# Patient Record
Sex: FEMALE | Race: WHITE | URBAN - METROPOLITAN AREA
[De-identification: names, ages, dates, MRNs, and addresses within clinical notes are randomized per-mention and may not be internally consistent; named-entity substitution may affect disease eponyms.]

---

## 2017-03-13 ENCOUNTER — TELEPHONE (OUTPATIENT)
Dept: INTERNAL MEDICINE | Facility: CLINIC | Age: 42
End: 2017-03-13

## 2017-03-13 NOTE — LETTER
[facility]  [address] [facility] [address] [phone]          March 13, 2017    Marina Shaikh  [address] [place] [address]            Our records indicate that you are due for a Pap smear   Monitoring and managing your preventative and chronic health conditions are very important to us.     If you have received your health care elsewhere, please provide us with that information so it can be documented in your chart.    Please call [phone] or message us through your [employer] account to schedule an appointment or provide information for your chart.     I look forward to seeing you and working with you on your health care needs.         Sincerely,       MINDY Monique   on behalf of   Zaina Banuelos MD

## 2017-04-13 ENCOUNTER — TELEPHONE (OUTPATIENT)
Dept: FAMILY MEDICINE | Facility: CLINIC | Age: 42
End: 2017-04-13

## 2017-04-13 DIAGNOSIS — E03.9 HYPOTHYROIDISM, UNSPECIFIED TYPE: ICD-10-CM

## 2017-04-13 DIAGNOSIS — E78.5 HYPERLIPIDEMIA WITH TARGET LDL LESS THAN 160: Primary | ICD-10-CM

## 2017-04-13 DIAGNOSIS — Z13.1 SCREENING FOR DIABETES MELLITUS: ICD-10-CM

## 2017-04-14 NOTE — TELEPHONE ENCOUNTER
Patient has upcoming physical on 04/27 she also has prior lab appt. On 04/22 in order to receive a refill on medication. Patient is just wanting to make sure that her orders are in for the Lab.

## 2017-04-14 NOTE — TELEPHONE ENCOUNTER
Need previsit labs-See pending orders and close encounter./Cassandra Eagle,         Physical 4/27/17

## 2017-04-17 PROBLEM — E03.9 HYPOTHYROIDISM, UNSPECIFIED TYPE: Status: ACTIVE | Noted: 2017-04-17

## 2017-04-22 DIAGNOSIS — E03.9 HYPOTHYROIDISM, UNSPECIFIED TYPE: ICD-10-CM

## 2017-04-22 DIAGNOSIS — Z13.1 SCREENING FOR DIABETES MELLITUS: ICD-10-CM

## 2017-04-22 DIAGNOSIS — E78.5 HYPERLIPIDEMIA WITH TARGET LDL LESS THAN 160: ICD-10-CM

## 2017-04-22 PROCEDURE — 82947 ASSAY GLUCOSE BLOOD QUANT: CPT | Performed by: PHYSICIAN ASSISTANT

## 2017-04-22 PROCEDURE — 36415 COLL VENOUS BLD VENIPUNCTURE: CPT | Performed by: PHYSICIAN ASSISTANT

## 2017-04-22 PROCEDURE — 80061 LIPID PANEL: CPT | Performed by: PHYSICIAN ASSISTANT

## 2017-04-22 PROCEDURE — 84443 ASSAY THYROID STIM HORMONE: CPT | Performed by: PHYSICIAN ASSISTANT

## 2017-04-22 NOTE — LETTER
[facility]  [address] [facility] [id number] [phone]        April 24, 2017    Marina Shaikh  [address] [place] [address]            Dear aMrina,    The results of your recent tests were normal.     Below is a copy of the results. If you have any questions or concerns, please call myself or my nurse at [phone].    Sincerely,    Kristen Kehr PA-C/na    Results for orders placed or performed in visit on 04/22/17   Lipid Profile with reflex to direct LDL   Result Value Ref Range    Cholesterol 132 <200 mg/dL    Triglycerides 140 <150 mg/dL    HDL Cholesterol 37 (L) >49 mg/dL    LDL Cholesterol Calculated 67 <100 mg/dL    Non HDL Cholesterol 95 <130 mg/dL   TSH with free T4 reflex   Result Value Ref Range    TSH 1.47 0.40 - 4.00 mU/L   Glucose   Result Value Ref Range    Glucose 92 70 - 99 mg/dL

## 2017-04-24 LAB
CHOLEST SERPL-MCNC: 132 MG/DL
GLUCOSE SERPL-MCNC: 92 MG/DL (ref 70–99)
HDLC SERPL-MCNC: 37 MG/DL
LDLC SERPL CALC-MCNC: 67 MG/DL
NONHDLC SERPL-MCNC: 95 MG/DL
TRIGL SERPL-MCNC: 140 MG/DL
TSH SERPL DL<=0.005 MIU/L-ACNC: 1.47 MU/L (ref 0.4–4)

## 2017-04-27 ENCOUNTER — OFFICE VISIT (OUTPATIENT)
Dept: FAMILY MEDICINE | Facility: CLINIC | Age: 42
End: 2017-04-27
Payer: COMMERCIAL

## 2017-04-27 VITALS
BODY MASS INDEX: 31.39 KG/M2 | TEMPERATURE: 97.5 F | HEIGHT: 67 IN | WEIGHT: 200 LBS | SYSTOLIC BLOOD PRESSURE: 112 MMHG | DIASTOLIC BLOOD PRESSURE: 68 MMHG | HEART RATE: 74 BPM

## 2017-04-27 DIAGNOSIS — Z12.4 SCREENING FOR MALIGNANT NEOPLASM OF CERVIX: ICD-10-CM

## 2017-04-27 DIAGNOSIS — Z00.00 ROUTINE GENERAL MEDICAL EXAMINATION AT A HEALTH CARE FACILITY: Primary | ICD-10-CM

## 2017-04-27 DIAGNOSIS — E78.5 HYPERLIPIDEMIA LDL GOAL <160: ICD-10-CM

## 2017-04-27 DIAGNOSIS — Z12.31 VISIT FOR SCREENING MAMMOGRAM: ICD-10-CM

## 2017-04-27 DIAGNOSIS — E66.09 OBESITY DUE TO EXCESS CALORIES, UNSPECIFIED OBESITY SEVERITY: ICD-10-CM

## 2017-04-27 DIAGNOSIS — E03.9 HYPOTHYROIDISM, UNSPECIFIED TYPE: ICD-10-CM

## 2017-04-27 PROCEDURE — 99396 PREV VISIT EST AGE 40-64: CPT | Performed by: PHYSICIAN ASSISTANT

## 2017-04-27 PROCEDURE — 87624 HPV HI-RISK TYP POOLED RSLT: CPT | Performed by: PHYSICIAN ASSISTANT

## 2017-04-27 PROCEDURE — G0145 SCR C/V CYTO,THINLAYER,RESCR: HCPCS | Performed by: PHYSICIAN ASSISTANT

## 2017-04-27 RX ORDER — LEVOTHYROXINE SODIUM 125 UG/1
TABLET ORAL
Qty: 90 TABLET | Refills: 2 | Status: SHIPPED | OUTPATIENT
Start: 2017-04-27 | End: 2018-05-25

## 2017-04-27 RX ORDER — LEVOTHYROXINE SODIUM 137 UG/1
TABLET ORAL
Qty: 90 TABLET | Refills: 3 | Status: SHIPPED | OUTPATIENT
Start: 2017-04-27 | End: 2018-05-25

## 2017-04-27 RX ORDER — SIMVASTATIN 20 MG
20 TABLET ORAL AT BEDTIME
Qty: 90 TABLET | Refills: 3 | Status: SHIPPED | OUTPATIENT
Start: 2017-04-27 | End: 2018-05-25

## 2017-04-27 NOTE — MR AVS SNAPSHOT
After Visit Summary   4/27/2017    Marina Shaikh    MRN: [id number]           Patient Information     Date Of Birth          1975        Visit Information        Provider Department      4/27/2017 8:30 AM Kehr, Kristen M, PA-C [facility]        Today's Diagnoses     Routine general medical examination at a health care facility    -  1    Hypothyroidism, unspecified type        Hyperlipidemia LDL goal <160        Screening for malignant neoplasm of cervix        Visit for screening mammogram          Care Instructions      Preventive Health Recommendations  Female Ages 40 to 49    Yearly exam:     See your health care provider every year in order to  1. Review health changes.   2. Discuss preventive care.    3. Review your medicines if your doctor prescribed any.      Get a Pap test every three years (unless you have an abnormal result and your provider advises testing more often).      If you get Pap tests with HPV test, you only need to test every 5 years, unless you have an abnormal result. You do not need a Pap test if your uterus was removed (hysterectomy) and you have not had cancer.      You should be tested each year for STDs (sexually transmitted diseases), if you're at risk.       Ask your doctor if you should have a mammogram.      Have a colonoscopy (test for colon cancer) if someone in your family has had colon cancer or polyps before age 50.       Have a cholesterol test every 5 years.       Have a diabetes test (fasting glucose) after age 45. If you are at risk for diabetes, you should have this test every 3 years.    Shots: Get a flu shot each year. Get a tetanus shot every 10 years.     Nutrition:     Eat at least 5 servings of fruits and vegetables each day.    Eat whole-grain bread, whole-wheat pasta and brown rice instead of white grains and rice.    Talk to your provider about Calcium and Vitamin D.     Lifestyle    Exercise at least 150 minutes a week (an average  "of 30 minutes a day, 5 days a week). This will help you control your weight and prevent disease.    Limit alcohol to one drink per day.    No smoking.     Wear sunscreen to prevent skin cancer.    See your dentist every six months for an exam and cleaning.      Schedule mammogram      Results for orders placed or performed in visit on 04/22/17   Lipid Profile with reflex to direct LDL   Result Value Ref Range    Cholesterol 132 <200 mg/dL    Triglycerides 140 <150 mg/dL    HDL Cholesterol 37 (L) >49 mg/dL    LDL Cholesterol Calculated 67 <100 mg/dL    Non HDL Cholesterol 95 <130 mg/dL   TSH with free T4 reflex   Result Value Ref Range    TSH 1.47 0.40 - 4.00 mU/L   Glucose   Result Value Ref Range    Glucose 92 70 - 99 mg/dL             Follow-ups after your visit        Future tests that were ordered for you today     Open Future Orders        Priority Expected Expires Ordered    *MA Screening Digital Bilateral Routine  4/27/2018 4/27/2017            Who to contact     If you have questions or need follow up information about today's clinic visit or your schedule please contact [facility] directly at [phone].  Normal or non-critical lab and imaging results will be communicated to you by [employer], letter or phone within 4 business days after the clinic has received the results. If you do not hear from us within 7 days, please contact the clinic through [employer] or phone. If you have a critical or abnormal lab result, we will notify you by phone as soon as possible.  Submit refill requests through [employer] or call your pharmacy and they will forward the refill request to us. Please allow 3 business days for your refill to be completed.          Additional Information About Your Visit        [employer] Information     [employer] lets you send messages to your doctor, view your test results, renew your prescriptions, schedule appointments and more. To sign up, go to [email or web address] . Click on \"Log " "in\" on the left side of the screen, which will take you to the Welcome page. Then click on \"Sign up Now\" on the right side of the page.     You will be asked to enter the access code listed below, as well as some personal information. Please follow the directions to create your username and password.     Your access code is: [email or web address]  Expires: 2017  9:12 AM     Your access code will  in 90 days. If you need help or a new code, please call your [place] clinic or [phone].        Care EveryWhere ID     This is your Care EveryWhere ID. This could be used by other organizations to access your [place] medical records  [id number]        Your Vitals Were     Pulse Temperature Height BMI (Body Mass Index)          74 97.5  F (36.4  C) (Oral) 5' 7\" (1.702 m) 31.32 kg/m2         Blood Pressure from Last 3 Encounters:   17 112/68   16 120/78   07/15/14 118/82    Weight from Last 3 Encounters:   17 200 lb (90.7 kg)   16 255 lb (115.7 kg)   07/15/14 237 lb (107.5 kg)              We Performed the Following     HPV High Risk Types DNA Cervical     Pap imaged thin layer screen with HPV - recommended age 30 - 65          Where to get your medicines      These medications were sent to [facility] [id number] [place] [address], [address]  [address] [facility] [address], [facility] [address]     Phone:  [phone]     levothyroxine 125 MCG tablet    levothyroxine 137 MCG tablet    simvastatin 20 MG tablet          Primary Care Provider Office Phone # Fax #    Kristen M Kehr, PA-C [phone]       [facility] [address] [facility] [address]        Thank you!     Thank you for choosing [facility]  for your care. Our goal is always to provide you with excellent care. Hearing back from our patients is one way we can continue to improve our services. Please take a few minutes to complete the written survey that you may receive in the " mail after your visit with us. Thank you!             Your Updated Medication List - Protect others around you: Learn how to safely use, store and throw away your medicines at www.disposemymeds.org.          This list is accurate as of: 4/27/17  9:16 AM.  Always use your most recent med list.                   Brand Name Dispense Instructions for use    CALCIUM + D PO          * levothyroxine 137 MCG tablet    SYNTHROID/LEVOTHROID    90 tablet    137 mcg Monday through Friday       * levothyroxine 125 MCG tablet    SYNTHROID/LEVOTHROID    90 tablet    125 mcg Sat and Sun, 137 mcg daily M-F       MULTI-VITAMIN PO      1 tablet once a day       simvastatin 20 MG tablet    ZOCOR    90 tablet    Take 1 tablet (20 mg) by mouth At Bedtime       * Notice:  This list has 2 medication(s) that are the same as other medications prescribed for you. Read the directions carefully, and ask your doctor or other care provider to review them with you.

## 2017-04-27 NOTE — PATIENT INSTRUCTIONS
Preventive Health Recommendations  Female Ages 40 to 49    Yearly exam:     See your health care provider every year in order to  1. Review health changes.   2. Discuss preventive care.    3. Review your medicines if your doctor prescribed any.      Get a Pap test every three years (unless you have an abnormal result and your provider advises testing more often).      If you get Pap tests with HPV test, you only need to test every 5 years, unless you have an abnormal result. You do not need a Pap test if your uterus was removed (hysterectomy) and you have not had cancer.      You should be tested each year for STDs (sexually transmitted diseases), if you're at risk.       Ask your doctor if you should have a mammogram.      Have a colonoscopy (test for colon cancer) if someone in your family has had colon cancer or polyps before age 50.       Have a cholesterol test every 5 years.       Have a diabetes test (fasting glucose) after age 45. If you are at risk for diabetes, you should have this test every 3 years.    Shots: Get a flu shot each year. Get a tetanus shot every 10 years.     Nutrition:     Eat at least 5 servings of fruits and vegetables each day.    Eat whole-grain bread, whole-wheat pasta and brown rice instead of white grains and rice.    Talk to your provider about Calcium and Vitamin D.     Lifestyle    Exercise at least 150 minutes a week (an average of 30 minutes a day, 5 days a week). This will help you control your weight and prevent disease.    Limit alcohol to one drink per day.    No smoking.     Wear sunscreen to prevent skin cancer.    See your dentist every six months for an exam and cleaning.      Schedule mammogram      Results for orders placed or performed in visit on 04/22/17   Lipid Profile with reflex to direct LDL   Result Value Ref Range    Cholesterol 132 <200 mg/dL    Triglycerides 140 <150 mg/dL    HDL Cholesterol 37 (L) >49 mg/dL    LDL Cholesterol Calculated 67 <100 mg/dL     Non HDL Cholesterol 95 <130 mg/dL   TSH with free T4 reflex   Result Value Ref Range    TSH 1.47 0.40 - 4.00 mU/L   Glucose   Result Value Ref Range    Glucose 92 70 - 99 mg/dL

## 2017-04-27 NOTE — LETTER
May 4, 2017      Marina Shaikh  [address] [place] [address]    Dear ,      I am happy to inform you that your recent cervical cancer screening test (PAP smear) was normal.      Preventative screenings such as this help to ensure your health for years to come. You should repeat a pap smear in 3 years, unless otherwise directed.      You will still need to return to the clinic every year for your annual exam and other preventive tests.     Please contact the clinic at [phone] if you have further questions.       Sincerely,      Kristen M. Kehr, PA-C/keli

## 2017-04-27 NOTE — PROGRESS NOTES
SUBJECTIVE:     CC: Marina Shaikh is an 41 year old woman who presents for preventive health visit.     Healthy Habits:    Do you get at least three servings of calcium containing foods daily (dairy, green leafy vegetables, etc.)? no, taking calcium and/or vitamin D supplement: yes - calcium     Amount of exercise or daily activities, outside of work: active    Problems taking medications regularly No    Medication side effects: No    Have you had an eye exam in the past two years? yes    Do you see a dentist twice per year? yes    Do you have sleep apnea, excessive snoring or daytime drowsiness?            Today's PHQ-2 Score:   PHQ-2 ( 1999 Pf[employer]) 4/27/2017 5/23/2016   Q1: Little interest or pleasure in doing things 0 0   Q2: Feeling down, depressed or hopeless 0 0   PHQ-2 Score 0 0       Abuse: Current or Past(Physical, Sexual or Emotional)- No  Do you feel safe in your environment - Yes    Social History   Substance Use Topics     Smoking status: Former Smoker     Quit date: 7/22/1999     Smokeless tobacco: Never Used     Alcohol use Yes      Comment: rare     The patient does not drink >3 drinks per day nor >7 drinks per week.    Recent Labs   Lab Test  04/22/17   0935  07/14/16   0707   07/07/14   0730  06/25/13   0718   CHOL  132  176   < >  239*  186   HDL  37*  38*   < >  35*  37*   LDL  67  99   < >  157*  113   TRIG  140  193*   < >  234*  182*   CHOLHDLRATIO   --    --    --   6.9*  5.0   NHDL  95  138*   < >   --    --     < > = values in this interval not displayed.       Reviewed orders with patient.  Reviewed health maintenance and updated orders accordingly - Yes    Mammo Decision Support:  Mammogram not appropriate for this patient based on age.  Patient under age 50, mutual decision reflected in health maintenance.      Pertinent mammograms are reviewed under the imaging tab.  History of abnormal Pap smear:   NO - age 30- 65 PAP every 3 years recommended  Last 3 Pap Results:   PAP (no units)    Date Value   07/02/2013 NIL   08/02/2010 NIL   07/22/2009 NIL       Reviewed and updated as needed this visit by clinical staff  Tobacco  Allergies  Meds  Med Hx  Surg Hx  Fam Hx  Soc Hx        Reviewed and updated as needed this visit by Provider        Past Medical History:   Diagnosis Date     Anxiety      Hyperlipidemia LDL goal < 160      Hypothyroid      Obesity       Past Surgical History:   Procedure Laterality Date     C LIGATE FALLOPIAN TUBE  06/27/2008       ROS:  C: NEGATIVE for fever, chills,She has been on the [employer] program for weight loss. She has lost a total of 55 pounds.  I: NEGATIVE for worrisome rashes, moles or lesions  E: NEGATIVE for vision changes or irritation  ENT: NEGATIVE for ear, mouth and throat problems  R: NEGATIVE for significant cough or SOB  B: NEGATIVE for masses, tenderness or discharge  CV: NEGATIVE for chest pain, palpitations or peripheral edema  GI: NEGATIVE for nausea, abdominal pain, heartburn, or change in bowel habits  : NEGATIVE for unusual urinary or vaginal symptoms. Periods are regular.  M: NEGATIVE for significant arthralgias or myalgia  N: NEGATIVE for weakness, dizziness or paresthesias  E: NEGATIVE for temperature intolerance, skin/hair changes  P: NEGATIVE for changes in mood or affect    Problem list, Medication list, Allergies, and Medical/Social/Surgical histories reviewed in EPIC and updated as appropriate.  BP Readings from Last 3 Encounters:   04/27/17 112/68   05/23/16 120/78   07/15/14 118/82    Wt Readings from Last 3 Encounters:   04/27/17 200 lb (90.7 kg)   05/23/16 255 lb (115.7 kg)   07/15/14 237 lb (107.5 kg)                  Patient Active Problem List   Diagnosis     Hyperlipidemia with target LDL less than 160     Obesity     Anxiety     Hypothyroidism     Hypothyroidism, unspecified type     Past Surgical History:   Procedure Laterality Date     C LIGATE FALLOPIAN TUBE  06/27/2008       Social History   Substance Use Topics      "Smoking status: Former Smoker     Quit date: 7/22/1999     Smokeless tobacco: Never Used     Alcohol use Yes      Comment: rare     Family History   Problem Relation Age of Onset     Lipids Mother      Hypertension Maternal Grandmother      CANCER Maternal Grandfather      HEART DISEASE Maternal Grandfather      Lipids Brother      HEART DISEASE Father          OBJECTIVE:     /68 (Cuff Size: Adult Large)  Pulse 74  Temp 97.5  F (36.4  C) (Oral)  Ht 5' 7\" (1.702 m)  Wt 200 lb (90.7 kg)  BMI 31.32 kg/m2  EXAM:  GENERAL: healthy, alert and no distress  EYES: Eyes grossly normal to inspection, PERRL and conjunctivae and sclerae normal  HENT: ear canals and TM's normal, nose and mouth without ulcers or lesions  NECK: no adenopathy, no asymmetry, masses, or scars and thyroid normal to palpation  RESP: lungs clear to auscultation - no rales, rhonchi or wheezes  BREAST: normal without masses, tenderness or nipple discharge and no palpable axillary masses or adenopathy  CV: regular rate and rhythm, normal S1 S2, no S3 or S4, no murmur, click or rub, no peripheral edema and peripheral pulses strong  ABDOMEN: soft, nontender, no hepatosplenomegaly, no masses and bowel sounds normal   (female): normal female external genitalia, normal urethral meatus, vaginal mucosa pink, moist, well rugated, and normal cervix/adnexa/uterus without masses or discharge  MS: no gross musculoskeletal defects noted, no edema  SKIN: no suspicious lesions or rashes  NEURO: Normal strength and tone, mentation intact and speech normal  PSYCH: mentation appears normal, affect normal/bright    Results for orders placed or performed in visit on 04/22/17   Lipid Profile with reflex to direct LDL   Result Value Ref Range    Cholesterol 132 <200 mg/dL    Triglycerides 140 <150 mg/dL    HDL Cholesterol 37 (L) >49 mg/dL    LDL Cholesterol Calculated 67 <100 mg/dL    Non HDL Cholesterol 95 <130 mg/dL   TSH with free T4 reflex   Result Value Ref " "Range    TSH 1.47 0.40 - 4.00 mU/L   Glucose   Result Value Ref Range    Glucose 92 70 - 99 mg/dL         ASSESSMENT/PLAN:     (Z00.00) Routine general medical examination at a health care facility  (primary encounter   Health maintenance reviewed and updated.      (E03.9) Hypothyroidism, unspecified type  Comment: stable, she had her lab tests done prior to her ppoitnment today.   Continue with the alternating dose of medications.   Plan: levothyroxine (SYNTHROID/LEVOTHROID) 137 MCG         tablet, levothyroxine (SYNTHROID/LEVOTHROID)         125 MCG tablet          (E78.5) Hyperlipidemia LDL goal <160  Comment: stable,   Plan: simvastatin (ZOCOR) 20 MG tablet          (Z12.4) Screening for malignant neoplasm of cervix  Plan: Pap imaged thin layer screen with HPV -         recommended age 30 - 65, HPV High Risk Types         DNA Cervical           (Z12.31) Visit for screening mammogram  Comment: scheduled.   Plan: *MA Screening Digital Bilateral          (E66.09) Obesity due to excess calories, unspecified obesity severity  Comment: She is currently on the [employer] Program and has lost 55 pounds. She feels great and her goal is to get down to 160 pounds.   Plan: Encouraged exercise. She is active, but does not dedicate time for exercise.       COUNSELING:   Reviewed preventive health counseling, as reflected in patient instructions       Regular exercise       Healthy diet/nutrition       Contraception       Family planning       reports that she quit smoking about 17 years ago. She has never used smokeless tobacco.    Estimated body mass index is 31.32 kg/(m^2) as calculated from the following:    Height as of this encounter: 5' 7\" (1.702 m).    Weight as of this encounter: 200 lb (90.7 kg).   Weight management plan: Discussed healthy diet and exercise guidelines and patient will follow up in 12 months in clinic to re-evaluate.    Counseling Resources:  ATP IV Guidelines  Pooled Cohorts Equation " Calculator  Breast Cancer Risk Calculator  FRAX Risk Assessment  ICSI Preventive Guidelines  Dietary Guidelines for Americans, 2010  [employer]'s MyPlate  ASA Prophylaxis  Lung CA Screening    Kristen M. Kehr, PA-C  [facility]

## 2017-04-27 NOTE — NURSING NOTE
"Chief Complaint   Patient presents with     Physical       Initial /68 (Cuff Size: Adult Large)  Pulse 74  Temp 97.5  F (36.4  C) (Oral)  Ht 5' 7\" (1.702 m)  Wt 200 lb (90.7 kg)  BMI 31.32 kg/m2 Estimated body mass index is 31.32 kg/(m^2) as calculated from the following:    Height as of this encounter: 5' 7\" (1.702 m).    Weight as of this encounter: 200 lb (90.7 kg).  Medication Reconciliation: complete    VIVIENNE Cuellar MA    "

## 2017-05-01 LAB
COPATH REPORT: NORMAL
PAP: NORMAL

## 2017-05-03 LAB
FINAL DIAGNOSIS: NORMAL
HPV HR 12 DNA CVX QL NAA+PROBE: NEGATIVE
HPV16 DNA SPEC QL NAA+PROBE: NEGATIVE
HPV18 DNA SPEC QL NAA+PROBE: NEGATIVE
SPECIMEN DESCRIPTION: NORMAL

## 2017-05-27 ENCOUNTER — RADIANT APPOINTMENT (OUTPATIENT)
Dept: MAMMOGRAPHY | Facility: CLINIC | Age: 42
End: 2017-05-27
Attending: PHYSICIAN ASSISTANT
Payer: COMMERCIAL

## 2017-05-27 DIAGNOSIS — Z12.31 VISIT FOR SCREENING MAMMOGRAM: ICD-10-CM

## 2017-05-27 PROCEDURE — G0202 SCR MAMMO BI INCL CAD: HCPCS | Mod: TC

## 2018-05-25 DIAGNOSIS — E03.9 HYPOTHYROIDISM, UNSPECIFIED TYPE: ICD-10-CM

## 2018-05-25 DIAGNOSIS — E78.5 HYPERLIPIDEMIA LDL GOAL <160: ICD-10-CM

## 2018-05-25 RX ORDER — LEVOTHYROXINE SODIUM 125 UG/1
TABLET ORAL
Qty: 8 TABLET | Refills: 0 | Status: SHIPPED | OUTPATIENT
Start: 2018-05-25 | End: 2018-06-18 | Stop reason: DRUGHIGH

## 2018-05-25 RX ORDER — SIMVASTATIN 20 MG
TABLET ORAL
Qty: 30 TABLET | Refills: 0 | Status: SHIPPED | OUTPATIENT
Start: 2018-05-25 | End: 2018-06-18

## 2018-05-25 RX ORDER — LEVOTHYROXINE SODIUM 137 UG/1
TABLET ORAL
Qty: 20 TABLET | Refills: 3 | Status: SHIPPED | OUTPATIENT
Start: 2018-05-25 | End: 2018-06-18 | Stop reason: ALTCHOICE

## 2018-05-25 NOTE — LETTER
May 25, 2018    Marina Shaikh  [address] [place] [address]    Dear Marina,       We recently received a refill request for levothyroxine and simvastatin.  We have refilled this for a one time 30 day supply only because you are due for a:    Thyroid and cholesterol office visit and fasting lab appointment      Please schedule this lab appointment 4-5 days prior to the office visit.     Please call at your earliest convenience so that there will not be a delay with your future refills.          Thank you,   Your [facility] Team/  [phone]

## 2018-05-25 NOTE — TELEPHONE ENCOUNTER
Medication is being filled for 1 time refill only due to:  Patient needs to be seen for fasting lab appointment and appointment with the provider for further refills.  Cholesterol and thyroid.  Marce MCKEONN, RN

## 2018-06-13 ENCOUNTER — DOCUMENTATION ONLY (OUTPATIENT)
Dept: LAB | Facility: CLINIC | Age: 43
End: 2018-06-13

## 2018-06-13 DIAGNOSIS — Z13.1 SCREENING FOR DIABETES MELLITUS: ICD-10-CM

## 2018-06-13 DIAGNOSIS — E78.5 HYPERLIPIDEMIA WITH TARGET LDL LESS THAN 160: ICD-10-CM

## 2018-06-13 DIAGNOSIS — E03.9 HYPOTHYROIDISM, UNSPECIFIED TYPE: ICD-10-CM

## 2018-06-13 DIAGNOSIS — E03.9 HYPOTHYROIDISM, UNSPECIFIED TYPE: Primary | ICD-10-CM

## 2018-06-13 LAB
CHOLEST SERPL-MCNC: 149 MG/DL
GLUCOSE SERPL-MCNC: 92 MG/DL (ref 70–99)
HDLC SERPL-MCNC: 60 MG/DL
LDLC SERPL CALC-MCNC: 76 MG/DL
NONHDLC SERPL-MCNC: 89 MG/DL
T4 FREE SERPL-MCNC: 1.14 NG/DL (ref 0.76–1.46)
TRIGL SERPL-MCNC: 66 MG/DL
TSH SERPL DL<=0.005 MIU/L-ACNC: 5.51 MU/L (ref 0.4–4)

## 2018-06-13 PROCEDURE — 82947 ASSAY GLUCOSE BLOOD QUANT: CPT | Performed by: PHYSICIAN ASSISTANT

## 2018-06-13 PROCEDURE — 36415 COLL VENOUS BLD VENIPUNCTURE: CPT | Performed by: PHYSICIAN ASSISTANT

## 2018-06-13 PROCEDURE — 80061 LIPID PANEL: CPT | Performed by: PHYSICIAN ASSISTANT

## 2018-06-13 PROCEDURE — 84443 ASSAY THYROID STIM HORMONE: CPT | Performed by: PHYSICIAN ASSISTANT

## 2018-06-13 PROCEDURE — 84439 ASSAY OF FREE THYROXINE: CPT | Performed by: PHYSICIAN ASSISTANT

## 2018-06-13 NOTE — LETTER
Shirley 15, 2018    Marina Shaikh  [address] [place] [address]      Dear Marina,     Below is a copy of your lab results.  Will discuss at your appointment next week. It was a pleasure to see you at your last appointment.    If you have any questions or concerns, please call myself or my nurse at [phone].    Sincerely,    Kristen Kehr, PA-C/vitaliy      Results for orders placed or performed in visit on 06/13/18   TSH with free T4 reflex   Result Value Ref Range    TSH 5.51 (H) 0.40 - 4.00 mU/L   Lipid panel reflex to direct LDL Fasting   Result Value Ref Range    Cholesterol 149 <200 mg/dL    Triglycerides 66 <150 mg/dL    HDL Cholesterol 60 >49 mg/dL    LDL Cholesterol Calculated 76 <100 mg/dL    Non HDL Cholesterol 89 <130 mg/dL   Glucose   Result Value Ref Range    Glucose 92 70 - 99 mg/dL   T4 free   Result Value Ref Range    T4 Free 1.14 0.76 - 1.46 ng/dL

## 2018-06-18 ENCOUNTER — OFFICE VISIT (OUTPATIENT)
Dept: FAMILY MEDICINE | Facility: CLINIC | Age: 43
End: 2018-06-18
Payer: COMMERCIAL

## 2018-06-18 VITALS
DIASTOLIC BLOOD PRESSURE: 76 MMHG | OXYGEN SATURATION: 99 % | HEIGHT: 66 IN | WEIGHT: 171 LBS | RESPIRATION RATE: 16 BRPM | TEMPERATURE: 99.9 F | BODY MASS INDEX: 27.48 KG/M2 | HEART RATE: 78 BPM | SYSTOLIC BLOOD PRESSURE: 119 MMHG

## 2018-06-18 DIAGNOSIS — E03.9 HYPOTHYROIDISM, UNSPECIFIED TYPE: Primary | ICD-10-CM

## 2018-06-18 DIAGNOSIS — E78.5 HYPERLIPIDEMIA LDL GOAL <160: ICD-10-CM

## 2018-06-18 PROCEDURE — 99214 OFFICE O/P EST MOD 30 MIN: CPT | Performed by: PHYSICIAN ASSISTANT

## 2018-06-18 RX ORDER — SIMVASTATIN 20 MG
TABLET ORAL
Qty: 90 TABLET | Refills: 3 | Status: SHIPPED | OUTPATIENT
Start: 2018-06-18

## 2018-06-18 RX ORDER — LEVOTHYROXINE SODIUM 137 UG/1
137 TABLET ORAL DAILY
Qty: 90 TABLET | Refills: 3 | Status: SHIPPED | OUTPATIENT
Start: 2018-06-18

## 2018-06-18 ASSESSMENT — PAIN SCALES - GENERAL: PAINLEVEL: NO PAIN (0)

## 2018-06-18 NOTE — PROGRESS NOTES
SUBJECTIVE:   CC: Marina Shaikh is an 43 year old woman who presents for preventive health visit.     Marina declines a full physical exam today. Health maintenance is up to date and she is here for a medication check and refills for chronic conditions today.   She is taking medication for her thyroid and cholesterol and had fasting lab tests prior to her appointment.       Physical   Annual:     Getting at least 3 servings of Calcium per day::  Yes    Bi-annual eye exam::  Yes    Dental care twice a year::  Yes    Sleep apnea or symptoms of sleep apnea::  None    Diet::  Regular (no restrictions)    Frequency of exercise::  1 day/week    Duration of exercise::  45-60 minutes    Taking medications regularly::  Yes    Medication side effects::  None    Additional concerns today::  No                  Today's PHQ-2 Score:   PHQ-2 ( 1999 [employer]) 6/18/2018   Q1: Little interest or pleasure in doing things 0   Q2: Feeling down, depressed or hopeless 0   PHQ-2 Score 0   Q1: Little interest or pleasure in doing things Not at all   Q2: Feeling down, depressed or hopeless Not at all   PHQ-2 Score 0       Abuse: Current or Past(Physical, Sexual or Emotional)- No  Do you feel safe in your environment - Yes    Social History   Substance Use Topics     Smoking status: Former Smoker     Quit date: 7/22/1999     Smokeless tobacco: Never Used     Alcohol use Yes      Comment: rare     Alcohol Use 6/18/2018   If you drink alcohol do you typically have greater than 3 drinks per day OR greater than 7 drinks per week? No   No flowsheet data found.    Reviewed orders with patient.  Reviewed health maintenance and updated orders accordingly - Yes  BP Readings from Last 3 Encounters:   06/18/18 119/76   04/27/17 112/68   05/23/16 120/78    Wt Readings from Last 3 Encounters:   06/18/18 171 lb (77.6 kg)   04/27/17 200 lb (90.7 kg)   05/23/16 255 lb (115.7 kg)                  Patient Active Problem List   Diagnosis     Hyperlipidemia  with target LDL less than 160     Obesity     Anxiety     Hypothyroidism     Hypothyroidism, unspecified type     Past Surgical History:   Procedure Laterality Date     C LIGATE FALLOPIAN TUBE  06/27/2008       Social History   Substance Use Topics     Smoking status: Former Smoker     Quit date: 7/22/1999     Smokeless tobacco: Never Used     Alcohol use Yes      Comment: rare     Family History   Problem Relation Age of Onset     Lipids Mother      Hypertension Maternal Grandmother      Cancer Maternal Grandfather      HEART DISEASE Maternal Grandfather      Lipids Brother      HEART DISEASE Father          Current Outpatient Prescriptions   Medication Sig Dispense Refill     CALCIUM + D OR        levothyroxine (SYNTHROID/LEVOTHROID) 137 MCG tablet Take 1 tablet (137 mcg) by mouth daily 90 tablet 3     MULTI-VITAMIN OR 1 tablet once a day       simvastatin (ZOCOR) 20 MG tablet TAKE ONE TABLET BY MOUTH EVERY NIGHT AT BEDTIME 90 tablet 3     Allergies   Allergen Reactions     Sulfa Drugs Rash       Patient under age 50, mutual decision reflected in health maintenance.      Pertinent mammograms are reviewed under the imaging tab.  History of abnormal Pap smear:   NO - age 30- 65 PAP every 3 years recommended  Last 3 Pap and HPV Results:   PAP / HPV Latest Ref Rng & Units 4/27/2017 7/2/2013 8/2/2010   PAP - NIL NIL NIL   HPV 16 DNA NEG Negative - -   HPV 18 DNA NEG Negative - -   OTHER HR HPV NEG Negative - -       Reviewed and updated as needed this visit by clinical staff  Tobacco  Allergies  Meds  Med Hx  Surg Hx  Fam Hx  Soc Hx        Reviewed and updated as needed this visit by Provider        Past Medical History:   Diagnosis Date     Anxiety      Hyperlipidemia LDL goal < 160      Hypothyroid      Obesity       Past Surgical History:   Procedure Laterality Date     C LIGATE FALLOPIAN TUBE  06/27/2008       Review of Systems  CONSTITUTIONAL: NEGATIVE for fever, chills, change in weight  INTEGUMENTARU/SKIN:  "NEGATIVE for worrisome rashes, moles or lesions  EYES: NEGATIVE for vision changes or irritation  ENT: NEGATIVE for ear, mouth and throat problems  RESP: NEGATIVE for significant cough or SOB  BREAST: NEGATIVE for masses, tenderness or discharge  CV: NEGATIVE for chest pain, palpitations or peripheral edema  GI: NEGATIVE for nausea, abdominal pain, heartburn, or change in bowel habits  : NEGATIVE for unusual urinary or vaginal symptoms. Periods are regular.  MUSCULOSKELETAL: NEGATIVE for significant arthralgias or myalgia  NEURO: NEGATIVE for weakness, dizziness or paresthesias  ENDOCRINE: NEGATIVE for temperature intolerance, skin/hair changes  PSYCHIATRIC: NEGATIVE for changes in mood or affect     OBJECTIVE:   /76  Pulse 78  Temp 99.9  F (37.7  C) (Oral)  Resp 16  Ht 5' 6\" (1.676 m)  Wt 171 lb (77.6 kg)  SpO2 99%  BMI 27.6 kg/m2  Physical Exam  GENERAL: healthy, alert and no distress  EYES: Eyes grossly normal to inspection, PERRL and conjunctivae and sclerae normal  HENT: ear canals and TM's normal, nose and mouth without ulcers or lesions  NECK: no adenopathy, no asymmetry, masses, or scars and thyroid normal to palpation  RESP: lungs clear to auscultation - no rales, rhonchi or wheezes  CV: regular rate and rhythm, normal S1 S2, no S3 or S4, no murmur, click or rub, no peripheral edema and peripheral pulses strong  MS: no gross musculoskeletal defects noted, no edema  SKIN: no suspicious lesions or rashes  NEURO: Normal strength and tone, mentation intact and speech normal  PSYCH: mentation appears normal, affect normal/bright    Results for orders placed or performed in visit on 06/13/18   TSH with free T4 reflex   Result Value Ref Range    TSH 5.51 (H) 0.40 - 4.00 mU/L   Lipid panel reflex to direct LDL Fasting   Result Value Ref Range    Cholesterol 149 <200 mg/dL    Triglycerides 66 <150 mg/dL    HDL Cholesterol 60 >49 mg/dL    LDL Cholesterol Calculated 76 <100 mg/dL    Non HDL Cholesterol " "89 <130 mg/dL   Glucose   Result Value Ref Range    Glucose 92 70 - 99 mg/dL   T4 free   Result Value Ref Range    T4 Free 1.14 0.76 - 1.46 ng/dL         ASSESSMENT/PLAN:   1. Hypothyroidism, unspecified type  She has been alternating her dose with 137 and 125. She is going to increase to the 137 mcg daily.   Recheck in 2 months with a lab only appointment.   - levothyroxine (SYNTHROID/LEVOTHROID) 137 MCG tablet; Take 1 tablet (137 mcg) by mouth daily  Dispense: 90 tablet; Refill: 3  - **TSH with free T4 reflex FUTURE 2mo; Future    2. Hyperlipidemia LDL goal <160  Stable, refills given x 1 year.   Above tests reviewed.   - simvastatin (ZOCOR) 20 MG tablet; TAKE ONE TABLET BY MOUTH EVERY NIGHT AT BEDTIME  Dispense: 90 tablet; Refill: 3    COUNSELING:  Reviewed preventive health counseling, as reflected in patient instructions       Regular exercise       Healthy diet/nutrition       reports that she quit smoking about 18 years ago. She has never used smokeless tobacco.    Estimated body mass index is 27.6 kg/(m^2) as calculated from the following:    Height as of this encounter: 5' 6\" (1.676 m).    Weight as of this encounter: 171 lb (77.6 kg).   Weight management plan: Discussed healthy diet and exercise guidelines and patient will follow up in 12 months in clinic to re-evaluate.    Counseling Resources:  ATP IV Guidelines  Pooled Cohorts Equation Calculator  Breast Cancer Risk Calculator  FRAX Risk Assessment  ICSI Preventive Guidelines  Dietary Guidelines for Americans, 2010  USDA's MyPlate  ASA Prophylaxis  Lung CA Screening    Kristen M. Kehr, PA-C  [facility]  "

## 2018-06-18 NOTE — NURSING NOTE
"Chief Complaint   Patient presents with     Physical     Health Maintenance       Initial /76  Pulse 78  Temp 99.9  F (37.7  C) (Oral)  Resp 16  Ht 5' 6\" (1.676 m)  Wt 171 lb (77.6 kg)  SpO2 99%  BMI 27.6 kg/m2 Estimated body mass index is 27.6 kg/(m^2) as calculated from the following:    Height as of this encounter: 5' 6\" (1.676 m).    Weight as of this encounter: 171 lb (77.6 kg).  Medication Reconciliation: complete    VIVIENNE Cuellar MA    "

## 2018-06-18 NOTE — MR AVS SNAPSHOT
After Visit Summary   6/18/2018    Marina Shaikh    MRN: [id number]           Patient Information     Date Of Birth          1975        Visit Information        Provider Department      6/18/2018 5:20 PM Kehr, Kristen M, PA-C [facility]        Today's Diagnoses     Hypothyroidism, unspecified type    -  1    Hyperlipidemia LDL goal <160          Care Instructions      Preventive Health Recommendations  Female Ages 40 to 49    Yearly exam:     See your health care provider every year in order to  1. Review health changes.   2. Discuss preventive care.    3. Review your medicines if your doctor prescribed any.      Get a Pap test every three years (unless you have an abnormal result and your provider advises testing more often).      If you get Pap tests with HPV test, you only need to test every 5 years, unless you have an abnormal result. You do not need a Pap test if your uterus was removed (hysterectomy) and you have not had cancer.      You should be tested each year for STDs (sexually transmitted diseases), if you're at risk.       Ask your doctor if you should have a mammogram.      Have a colonoscopy (test for colon cancer) if someone in your family has had colon cancer or polyps before age 50.       Have a cholesterol test every 5 years.       Have a diabetes test (fasting glucose) after age 45. If you are at risk for diabetes, you should have this test every 3 years.    Shots: Get a flu shot each year. Get a tetanus shot every 10 years.     Nutrition:     Eat at least 5 servings of fruits and vegetables each day.    Eat whole-grain bread, whole-wheat pasta and brown rice instead of white grains and rice.    Talk to your provider about Calcium and Vitamin D.     Lifestyle    Exercise at least 150 minutes a week (an average of 30 minutes a day, 5 days a week). This will help you control your weight and prevent disease.    Limit alcohol to one drink per day.    No smoking.  "    Wear sunscreen to prevent skin cancer.    See your dentist every six months for an exam and cleaning.          Follow-ups after your visit        Follow-up notes from your care team     Return for Lab Work in 2 - 3 months.      Future tests that were ordered for you today     Open Future Orders        Priority Expected Expires Ordered    **TSH with free T4 reflex FUTURE 2mo Routine 8/17/2018 10/16/2018 6/18/2018            Who to contact     If you have questions or need follow up information about today's clinic visit or your schedule please contact [facility] directly at [phone].  Normal or non-critical lab and imaging results will be communicated to you by MyChart, letter or phone within 4 business days after the clinic has received the results. If you do not hear from us within 7 days, please contact the clinic through [employer] or phone. If you have a critical or abnormal lab result, we will notify you by phone as soon as possible.  Submit refill requests through Re[employer]ept or call your pharmacy and they will forward the refill request to us. Please allow 3 business days for your refill to be completed.          Additional Information About Your Visit        Care EveryWhere ID     This is your Care EveryWhere ID. This could be used by other organizations to access your [place] medical records  [id number]        Your Vitals Were     Pulse Temperature Respirations Height Pulse Oximetry BMI (Body Mass Index)    78 99.9  F (37.7  C) (Oral) 16 5' 6\" (1.676 m) 99% 27.6 kg/m2       Blood Pressure from Last 3 Encounters:   06/18/18 119/76   04/27/17 112/68   05/23/16 120/78    Weight from Last 3 Encounters:   06/18/18 171 lb (77.6 kg)   04/27/17 200 lb (90.7 kg)   05/23/16 255 lb (115.7 kg)                 Today's Medication Changes          These changes are accurate as of 6/18/18  5:52 PM.  If you have any questions, ask your nurse or doctor.               These medicines have changed or have " updated prescriptions.        Dose/Directions    levothyroxine 137 MCG tablet   Commonly known as:  SYNTHROID/LEVOTHROID   This may have changed:    - See the new instructions.  - Another medication with the same name was removed. Continue taking this medication, and follow the directions you see here.   Used for:  Hypothyroidism, unspecified type   Changed by:  Kehr, Kristen M, PA-C        Dose:  137 mcg   Take 1 tablet (137 mcg) by mouth daily   Quantity:  90 tablet   Refills:  3            Where to get your medicines      These medications were sent to [facility] [address], [address]  [address] [address], [address], [place] [address]     Phone:  [phone]     levothyroxine 137 MCG tablet    simvastatin 20 MG tablet                Primary Care Provider Office Phone # Fax #    Kristen M Kehr, PA-C [phone] [address] [address] [address]        Equal Access to Services     [facility] CenterREGGIE : Hadii aleksandar pierre hadasho Soomaali, waaxda luqadaha, qaybta kaalmada adeegyada, waxay joshuain hayrasta elena . So [facility] [phone].    ATENCIÓN: Si habla español, tiene a de la cruz disposición servicios gratuitos de asistencia lingüística. Llame al [phone].    We comply with applicable federal civil rights laws and [place] laws. We do not discriminate on the basis of race, color, national origin, age, disability, sex, sexual orientation, or gender identity.            Thank you!     Thank you for choosing [facility]  for your care. Our goal is always to provide you with excellent care. Hearing back from our patients is one way we can continue to improve our services. Please take a few minutes to complete the written survey that you may receive in the mail after your visit with us. Thank you!             Your Updated Medication List - Protect others around you: Learn how to safely use, store and throw away your medicines at  www.disposemymeds.org.          This list is accurate as of 6/18/18  5:52 PM.  Always use your most recent med list.                   Brand Name Dispense Instructions for use Diagnosis    CALCIUM + D PO           levothyroxine 137 MCG tablet    SYNTHROID/LEVOTHROID    90 tablet    Take 1 tablet (137 mcg) by mouth daily    Hypothyroidism, unspecified type       MULTI-VITAMIN PO      1 tablet once a day        simvastatin 20 MG tablet    ZOCOR    90 tablet    TAKE ONE TABLET BY MOUTH EVERY NIGHT AT BEDTIME    Hyperlipidemia LDL goal <160

## 2018-08-28 DIAGNOSIS — E03.9 HYPOTHYROIDISM, UNSPECIFIED TYPE: ICD-10-CM

## 2018-08-28 PROCEDURE — 84443 ASSAY THYROID STIM HORMONE: CPT | Performed by: PHYSICIAN ASSISTANT

## 2018-08-28 PROCEDURE — 36415 COLL VENOUS BLD VENIPUNCTURE: CPT | Performed by: PHYSICIAN ASSISTANT

## 2018-08-29 LAB — TSH SERPL DL<=0.005 MIU/L-ACNC: 2.2 MU/L (ref 0.4–4)

## 2018-08-30 NOTE — PROGRESS NOTES
Please contact Marina and let her know that her thyroid test is normal and that she can continue to take the 137 mcg dose of levothyroxine.   Kristen Kehr PA-C

## 2018-09-27 ENCOUNTER — OFFICE VISIT (OUTPATIENT)
Dept: FAMILY MEDICINE | Facility: CLINIC | Age: 43
End: 2018-09-27
Payer: COMMERCIAL

## 2018-09-27 VITALS
WEIGHT: 181 LBS | DIASTOLIC BLOOD PRESSURE: 83 MMHG | BODY MASS INDEX: 29.21 KG/M2 | HEART RATE: 83 BPM | TEMPERATURE: 98.3 F | SYSTOLIC BLOOD PRESSURE: 131 MMHG | RESPIRATION RATE: 16 BRPM | OXYGEN SATURATION: 100 %

## 2018-09-27 DIAGNOSIS — R68.84 JAW PAIN: Primary | ICD-10-CM

## 2018-09-27 DIAGNOSIS — H92.02 LEFT EAR PAIN: ICD-10-CM

## 2018-09-27 PROCEDURE — 99213 OFFICE O/P EST LOW 20 MIN: CPT | Performed by: FAMILY MEDICINE

## 2018-09-27 RX ORDER — CYCLOBENZAPRINE HCL 5 MG
TABLET ORAL
Qty: 90 TABLET | Refills: 0 | Status: SHIPPED | OUTPATIENT
Start: 2018-09-27

## 2018-09-27 ASSESSMENT — PAIN SCALES - GENERAL: PAINLEVEL: MODERATE PAIN (5)

## 2018-09-27 NOTE — MR AVS SNAPSHOT
After Visit Summary   9/27/2018    Marina Shaikh    MRN: [id number]           Patient Information     Date Of Birth          1975        Visit Information        Provider Department      9/27/2018 3:40 PM Kira Pratt MD [facility]        Today's Diagnoses     Jaw pain    -  1    Left ear pain           Follow-ups after your visit        Additional Services     OTOLARYNGOLOGY REFERRAL       Your provider has referred you to: [facility]: [facility] [phone]  [email or web address]    Please be aware that coverage of these services is subject to the terms and limitations of your health insurance plan.  Call member services at your health plan with any benefit or coverage questions.      Please bring the following with you to your appointment:    (1) Any X-Rays, CTs or MRIs which have been performed.  Contact the facility where they were done to arrange for  prior to your scheduled appointment.   (2) List of current medications  (3) This referral request   (4) Any documents/labs given to you for this referral                  Who to contact     If you have questions or need follow up information about today's clinic visit or your schedule please contact [facility] directly at [phone].  Normal or non-critical lab and imaging results will be communicated to you by MyChart, letter or phone within 4 business days after the clinic has received the results. If you do not hear from us within 7 days, please contact the clinic through [employer] or phone. If you have a critical or abnormal lab result, we will notify you by phone as soon as possible.  Submit refill requests through S[employer] or call your pharmacy and they will forward the refill request to us. Please allow 3 business days for your refill to be completed.          Additional Information About Your Visit        [employer] Information     S[employer] lets you send  "messages to your doctor, view your test results, renew your prescriptions, schedule appointments and more. To sign up, go to [email or web address] . Click on \"Log in\" on the left side of the screen, which will take you to the Welcome page. Then click on \"Sign up Now\" on the right side of the page.     You will be asked to enter the access code listed below, as well as some personal information. Please follow the directions to create your username and password.     Your access code is: [email or web address]  Expires: 2018  4:45 PM     Your access code will  in 90 days. If you need help or a new code, please call your [place] clinic or [phone].        Care EveryWhere ID     This is your Care EveryWhere ID. This could be used by other organizations to access your [place] medical records  [id number]        Your Vitals Were     Pulse Temperature Respirations Pulse Oximetry Breastfeeding? BMI (Body Mass Index)    83 98.3  F (36.8  C) (Oral) 16 100% No 29.21 kg/m2       Blood Pressure from Last 3 Encounters:   18 131/83   18 119/76   17 112/68    Weight from Last 3 Encounters:   18 181 lb (82.1 kg)   18 171 lb (77.6 kg)   17 200 lb (90.7 kg)              We Performed the Following     OTOLARYNGOLOGY REFERRAL          Today's Medication Changes          These changes are accurate as of 18  5:53 PM.  If you have any questions, ask your nurse or doctor.               Start taking these medicines.        Dose/Directions    cyclobenzaprine 5 MG tablet   Commonly known as:  FLEXERIL   Used for:  Jaw pain, Left ear pain   Started by:  Kira Pratt MD        May take 1 or 2 tablets 3 times a day as needed for muscle spasm and pain. Sedating. Preferably take at bedtime   Quantity:  90 tablet   Refills:  0            Where to get your medicines      These medications were sent to [facility] [address], [address]  [address], [address], [place] [address]     Phone:  [phone]     cyclobenzaprine 5 MG tablet                Primary Care Provider Office Phone # Fax #    Kristen M Kehr, PA-C [phone] [address] [address] [facility] [address]        Equal Access to Services     ARMIDA HENDRICKS : Hadii aad ku hadasho Soomaali, waaxda luqadaha, qaybta kaalmada adeegyada, waxay idiin hayaan adeeg kharash la'aan . So [facility] [phone].    ATENCIÓN: Si habla español, tiene a de la cruz disposición servicios gratuitos de asistencia lingüística. Llame al [phone].    We comply with applicable federal civil rights laws and [place] laws. We do not discriminate on the basis of race, color, national origin, age, disability, sex, sexual orientation, or gender identity.            Thank you!     Thank you for choosing [facility]  for your care. Our goal is always to provide you with excellent care. Hearing back from our patients is one way we can continue to improve our services. Please take a few minutes to complete the written survey that you may receive in the mail after your visit with us. Thank you!             Your Updated Medication List - Protect others around you: Learn how to safely use, store and throw away your medicines at www.disposemymeds.org.          This list is accurate as of 9/27/18  5:53 PM.  Always use your most recent med list.                   Brand Name Dispense Instructions for use Diagnosis    CALCIUM + D PO           cyclobenzaprine 5 MG tablet    FLEXERIL    90 tablet    May take 1 or 2 tablets 3 times a day as needed for muscle spasm and pain. Sedating. Preferably take at bedtime    Jaw pain, Left ear pain       levothyroxine 137 MCG tablet    SYNTHROID/LEVOTHROID    90 tablet    Take 1 tablet (137 mcg) by mouth daily    Hypothyroidism, unspecified type       MULTI-VITAMIN PO      1 tablet once a day        simvastatin 20 MG tablet    ZOCOR    90 tablet    TAKE ONE TABLET BY MOUTH EVERY  NIGHT AT BEDTIME    Hyperlipidemia LDL goal <160

## 2018-09-27 NOTE — PROGRESS NOTES
SUBJECTIVE:   Marina Shaikh is a 43 year old female who presents to clinic today for the following health issues:      Patient presents with:  Otalgia: left ear pain x 3 days    3-4 days ago started with a left ear pain. Mild   Thought her glasses were just too tight  Shooting pain in the ear and behind her ear    No fevers or chills chest pain or shortness of breath no runny nose no sore throat  No cough  No dental pain  Ibuprofen helps   Past couple of night hurt to lay on that side.  No swimming  Fever: No  Tried over the counter medications without relief  No fevers or chills chest pain or shortness of breath   No rash  Ill-contacts: none    Patient has been under a lot of stress at work. No thoughts of harming self or others  Denies depresson    Because of persistent and worsening symptoms came in to be seen    Problem list and histories reviewed & adjusted, as indicated.  Additional history: as documented    Problem list, Medication list, Allergies, and Medical/Social/Surgical histories reviewed in EPIC and updated as appropriate.    ROS:  Constitutional, HEENT, cardiovascular, pulmonary, gi and gu systems are negative, except as otherwise noted.    OBJECTIVE:                                                    /83  Pulse 83  Temp 98.3  F (36.8  C) (Oral)  Resp 16  Wt 181 lb (82.1 kg)  SpO2 100%  Breastfeeding? No  BMI 29.21 kg/m2  Body mass index is 29.21 kg/(m^2).  GENERAL: healthy, alert and no distress  EYES: pink palpebral conjunctiva, anicteric sclera, pupils equally reactive to light and accomodation, extraocular muscles intact full and equal.  ENT: midline nasal septum, no nasal congestion   LEFT TMJ PAIN AND TENDERNESS  Left ear:no tragal tenderness, no mastoid tenderness normal tympaninc membrane   Right ear: no tragal tenderness, no mastoid tenderness normal tympaninc membrane   NECK: no adenopathy, no asymmetry or  masses  RESP: lungs clear to auscultation - no rales, rhonchi or  wheezes  CV: regular rate and rhythm, normal S1 S2, no S3 or S4, no murmur, click or rub, no peripheral edema and peripheral pulses strong  MS: no gross musculoskeletal defects noted, no edema  Musculoskeletal: Nocervical spine tenderness   YES trapezius and levator muscle spasm on the left    Increased pain  In range of motion of the neck  No neurologic deficits. No sensory deficits or motor deficits both upper and lower extremity     NEURO: Normal strength and tone, mentation intact and speech normal    Diagnostic Test Results:  No results found for this or any previous visit (from the past 24 hour(s)).     ASSESSMENT/PLAN:                                                        ICD-10-CM    1. Jaw pain R68.84 cyclobenzaprine (FLEXERIL) 5 MG tablet     OTOLARYNGOLOGY REFERRAL   2. Left ear pain H92.02 cyclobenzaprine (FLEXERIL) 5 MG tablet     OTOLARYNGOLOGY REFERRAL     Prescribed with flexeril.   Signs or symptoms consistent with TMJ - recommend follow up with her dentist  If symptoms persist recommend seeing ENT within 3-4 weeks.   Adverse reactions of medications discussed.  Over the counter medications discussed.   Aware to come back in if with worsening symptoms or if no relief despite treatment plan  Patient voiced understanding and had no further questions.     Alarm signs or symptoms discussed, if present recommend go to ER     MD Kira Gomez MD  [facility]